# Patient Record
Sex: FEMALE | Race: WHITE | Employment: OTHER | URBAN - NONMETROPOLITAN AREA
[De-identification: names, ages, dates, MRNs, and addresses within clinical notes are randomized per-mention and may not be internally consistent; named-entity substitution may affect disease eponyms.]

---

## 2020-02-02 ENCOUNTER — APPOINTMENT (OUTPATIENT)
Dept: CT IMAGING | Age: 85
End: 2020-02-02
Payer: MEDICARE

## 2020-02-02 ENCOUNTER — HOSPITAL ENCOUNTER (EMERGENCY)
Age: 85
Discharge: HOME | End: 2020-02-02
Attending: EMERGENCY MEDICINE
Payer: MEDICARE

## 2020-02-02 ENCOUNTER — APPOINTMENT (OUTPATIENT)
Dept: GENERAL RADIOLOGY | Age: 85
End: 2020-02-02
Payer: MEDICARE

## 2020-02-02 VITALS
WEIGHT: 122 LBS | RESPIRATION RATE: 13 BRPM | HEIGHT: 59 IN | HEART RATE: 89 BPM | TEMPERATURE: 98 F | OXYGEN SATURATION: 95 % | BODY MASS INDEX: 24.6 KG/M2 | SYSTOLIC BLOOD PRESSURE: 140 MMHG | DIASTOLIC BLOOD PRESSURE: 69 MMHG

## 2020-02-02 LAB
-: ABNORMAL
ABSOLUTE EOS #: 0.23 K/UL (ref 0–0.44)
ABSOLUTE IMMATURE GRANULOCYTE: 0.26 K/UL (ref 0–0.3)
ABSOLUTE LYMPH #: 1.14 K/UL (ref 1.1–3.7)
ABSOLUTE MONO #: 1.83 K/UL (ref 0.1–1.2)
ALBUMIN SERPL-MCNC: 2.5 G/DL (ref 3.5–5.2)
ALBUMIN/GLOBULIN RATIO: 0.6 (ref 1–2.5)
ALP BLD-CCNC: 89 U/L (ref 35–104)
ALT SERPL-CCNC: 18 U/L (ref 5–33)
AMORPHOUS: ABNORMAL
ANION GAP SERPL CALCULATED.3IONS-SCNC: 14 MMOL/L (ref 9–17)
AST SERPL-CCNC: 23 U/L
BACTERIA: ABNORMAL
BASOPHILS # BLD: 0 % (ref 0–2)
BASOPHILS ABSOLUTE: 0.04 K/UL (ref 0–0.2)
BILIRUB SERPL-MCNC: 0.49 MG/DL (ref 0.3–1.2)
BILIRUBIN DIRECT: <0.08 MG/DL
BILIRUBIN URINE: NEGATIVE
BILIRUBIN, INDIRECT: ABNORMAL MG/DL (ref 0–1)
BUN BLDV-MCNC: 19 MG/DL (ref 8–23)
BUN/CREAT BLD: 28 (ref 9–20)
C-REACTIVE PROTEIN: 249.9 MG/L (ref 0–5)
CALCIUM SERPL-MCNC: 9.4 MG/DL (ref 8.6–10.4)
CASTS UA: ABNORMAL /LPF
CHLORIDE BLD-SCNC: 91 MMOL/L (ref 98–107)
CO2: 30 MMOL/L (ref 20–31)
COLOR: YELLOW
COMMENT UA: ABNORMAL
CREAT SERPL-MCNC: 0.69 MG/DL (ref 0.5–0.9)
CRYSTALS, UA: ABNORMAL /HPF
DIFFERENTIAL TYPE: ABNORMAL
EKG ATRIAL RATE: 84 BPM
EKG P AXIS: 39 DEGREES
EKG P-R INTERVAL: 148 MS
EKG Q-T INTERVAL: 362 MS
EKG QRS DURATION: 90 MS
EKG QTC CALCULATION (BAZETT): 427 MS
EKG R AXIS: -22 DEGREES
EKG T AXIS: 24 DEGREES
EKG VENTRICULAR RATE: 84 BPM
EOSINOPHILS RELATIVE PERCENT: 1 % (ref 1–4)
EPITHELIAL CELLS UA: ABNORMAL /HPF (ref 0–25)
GFR AFRICAN AMERICAN: >60 ML/MIN
GFR NON-AFRICAN AMERICAN: >60 ML/MIN
GFR SERPL CREATININE-BSD FRML MDRD: ABNORMAL ML/MIN/{1.73_M2}
GFR SERPL CREATININE-BSD FRML MDRD: ABNORMAL ML/MIN/{1.73_M2}
GLOBULIN: ABNORMAL G/DL (ref 1.5–3.8)
GLUCOSE BLD-MCNC: 107 MG/DL (ref 70–99)
GLUCOSE URINE: NEGATIVE
HCT VFR BLD CALC: 32.3 % (ref 36.3–47.1)
HEMOGLOBIN: 10.3 G/DL (ref 11.9–15.1)
IMMATURE GRANULOCYTES: 1 %
KETONES, URINE: NEGATIVE
LACTIC ACID, SEPSIS WHOLE BLOOD: NORMAL MMOL/L (ref 0.5–1.9)
LACTIC ACID, SEPSIS: 1.2 MMOL/L (ref 0.5–1.9)
LEUKOCYTE ESTERASE, URINE: ABNORMAL
LIPASE: 23 U/L (ref 13–60)
LYMPHOCYTES # BLD: 5 % (ref 24–43)
MAGNESIUM: 1.8 MG/DL (ref 1.6–2.6)
MCH RBC QN AUTO: 27.3 PG (ref 25.2–33.5)
MCHC RBC AUTO-ENTMCNC: 31.9 G/DL (ref 28.4–34.8)
MCV RBC AUTO: 85.7 FL (ref 82.6–102.9)
MONOCYTES # BLD: 9 % (ref 3–12)
MUCUS: ABNORMAL
MYOGLOBIN: 59 NG/ML (ref 25–58)
NITRITE, URINE: NEGATIVE
NRBC AUTOMATED: 0 PER 100 WBC
OTHER OBSERVATIONS UA: ABNORMAL
PDW BLD-RTO: 13.2 % (ref 11.8–14.4)
PH UA: 6 (ref 5–9)
PLATELET # BLD: 437 K/UL (ref 138–453)
PLATELET ESTIMATE: ABNORMAL
PMV BLD AUTO: 11.3 FL (ref 8.1–13.5)
POTASSIUM SERPL-SCNC: 3.3 MMOL/L (ref 3.7–5.3)
PROTEIN UA: NEGATIVE
RBC # BLD: 3.77 M/UL (ref 3.95–5.11)
RBC # BLD: ABNORMAL 10*6/UL
RBC UA: ABNORMAL /HPF (ref 0–2)
RENAL EPITHELIAL, UA: ABNORMAL /HPF
SEDIMENTATION RATE, ERYTHROCYTE: 119 MM (ref 0–20)
SEG NEUTROPHILS: 84 % (ref 36–65)
SEGMENTED NEUTROPHILS ABSOLUTE COUNT: 17.94 K/UL (ref 1.5–8.1)
SODIUM BLD-SCNC: 135 MMOL/L (ref 135–144)
SPECIFIC GRAVITY UA: 1.02 (ref 1.01–1.02)
TOTAL PROTEIN: 6.9 G/DL (ref 6.4–8.3)
TRICHOMONAS: ABNORMAL
TROPONIN INTERP: NORMAL
TROPONIN T: <0.03 NG/ML
TROPONIN, HIGH SENSITIVITY: NORMAL NG/L (ref 0–14)
TSH SERPL DL<=0.05 MIU/L-ACNC: 3.1 MIU/L (ref 0.3–5)
TURBIDITY: CLEAR
URINE HGB: ABNORMAL
UROBILINOGEN, URINE: NORMAL
WBC # BLD: 21.4 K/UL (ref 3.5–11.3)
WBC # BLD: ABNORMAL 10*3/UL
WBC UA: ABNORMAL /HPF (ref 0–5)
YEAST: ABNORMAL

## 2020-02-02 PROCEDURE — 83605 ASSAY OF LACTIC ACID: CPT

## 2020-02-02 PROCEDURE — 86140 C-REACTIVE PROTEIN: CPT

## 2020-02-02 PROCEDURE — 6360000002 HC RX W HCPCS: Performed by: EMERGENCY MEDICINE

## 2020-02-02 PROCEDURE — 99284 EMERGENCY DEPT VISIT MOD MDM: CPT

## 2020-02-02 PROCEDURE — 70450 CT HEAD/BRAIN W/O DYE: CPT

## 2020-02-02 PROCEDURE — 87040 BLOOD CULTURE FOR BACTERIA: CPT

## 2020-02-02 PROCEDURE — 6360000004 HC RX CONTRAST MEDICATION: Performed by: EMERGENCY MEDICINE

## 2020-02-02 PROCEDURE — 85025 COMPLETE CBC W/AUTO DIFF WBC: CPT

## 2020-02-02 PROCEDURE — 93010 ELECTROCARDIOGRAM REPORT: CPT | Performed by: INTERNAL MEDICINE

## 2020-02-02 PROCEDURE — 80076 HEPATIC FUNCTION PANEL: CPT

## 2020-02-02 PROCEDURE — 83735 ASSAY OF MAGNESIUM: CPT

## 2020-02-02 PROCEDURE — 83874 ASSAY OF MYOGLOBIN: CPT

## 2020-02-02 PROCEDURE — 81001 URINALYSIS AUTO W/SCOPE: CPT

## 2020-02-02 PROCEDURE — 84443 ASSAY THYROID STIM HORMONE: CPT

## 2020-02-02 PROCEDURE — 87086 URINE CULTURE/COLONY COUNT: CPT

## 2020-02-02 PROCEDURE — 93005 ELECTROCARDIOGRAM TRACING: CPT | Performed by: EMERGENCY MEDICINE

## 2020-02-02 PROCEDURE — 80048 BASIC METABOLIC PNL TOTAL CA: CPT

## 2020-02-02 PROCEDURE — 74177 CT ABD & PELVIS W/CONTRAST: CPT

## 2020-02-02 PROCEDURE — 96374 THER/PROPH/DIAG INJ IV PUSH: CPT

## 2020-02-02 PROCEDURE — 36415 COLL VENOUS BLD VENIPUNCTURE: CPT

## 2020-02-02 PROCEDURE — 84484 ASSAY OF TROPONIN QUANT: CPT

## 2020-02-02 PROCEDURE — 71046 X-RAY EXAM CHEST 2 VIEWS: CPT

## 2020-02-02 PROCEDURE — 85651 RBC SED RATE NONAUTOMATED: CPT

## 2020-02-02 PROCEDURE — 83690 ASSAY OF LIPASE: CPT

## 2020-02-02 RX ORDER — TRIAMTERENE AND HYDROCHLOROTHIAZIDE 37.5; 25 MG/1; MG/1
1 CAPSULE ORAL
COMMUNITY
Start: 2015-08-04

## 2020-02-02 RX ORDER — PREDNISONE 20 MG/1
TABLET ORAL
Qty: 65 TABLET | Refills: 0 | Status: SHIPPED | OUTPATIENT
Start: 2020-02-02

## 2020-02-02 RX ORDER — POTASSIUM CHLORIDE 750 MG/1
10 TABLET, FILM COATED, EXTENDED RELEASE ORAL
COMMUNITY
Start: 2019-08-20

## 2020-02-02 RX ORDER — DEXAMETHASONE SODIUM PHOSPHATE 10 MG/ML
6 INJECTION INTRAMUSCULAR; INTRAVENOUS ONCE
Status: COMPLETED | OUTPATIENT
Start: 2020-02-02 | End: 2020-02-02

## 2020-02-02 RX ADMIN — IOPAMIDOL 75 ML: 755 INJECTION, SOLUTION INTRAVENOUS at 11:17

## 2020-02-02 RX ADMIN — DEXAMETHASONE SODIUM PHOSPHATE 6 MG: 10 INJECTION INTRAMUSCULAR; INTRAVENOUS at 13:03

## 2020-02-02 ASSESSMENT — ENCOUNTER SYMPTOMS: EYE REDNESS: 0

## 2020-02-02 ASSESSMENT — PAIN SCALES - GENERAL: PAINLEVEL_OUTOF10: 3

## 2020-02-02 ASSESSMENT — PAIN DESCRIPTION - PAIN TYPE: TYPE: ACUTE PAIN

## 2020-02-02 ASSESSMENT — PAIN DESCRIPTION - LOCATION: LOCATION: HEAD

## 2020-02-02 NOTE — ED NOTES
Emerita Lopez from lab at bedside attempting cultures and lactic      Juliana Johnson RN  02/02/20 1010

## 2020-02-02 NOTE — ED NOTES
Blood collected, IV unsuccessful.  Walter RN going to attempt Marli Bonds 15, RN  02/02/20 476 [facility]  02/02/20 5919

## 2020-02-02 NOTE — ED NOTES
Dr Ryder Marks at bedside discussing results with patient and family     Bri Hutson RN  02/02/20 [phone]

## 2020-02-02 NOTE — ED PROVIDER NOTES
Forced sexual activity: Not on file   Other Topics Concern    Not on file   Social History Narrative    Not on file       SCREENINGS             PHYSICAL EXAM    (up to 7 for level 4, 8 or more for level 5)     ED Triage Vitals [02/02/20 0836]   BP Temp Temp Source Pulse Resp SpO2 Height Weight   (!) 142/73 98 °F (36.7 °C) Oral 85 23 97 % 4' 11\" (1.499 m) 122 lb (55.3 kg)       Physical Exam  Vitals signs reviewed. Constitutional:       Appearance: Normal appearance. She is not ill-appearing. HENT:      Head: Normocephalic. Right Ear: External ear normal.      Left Ear: External ear normal.      Nose: Nose normal.      Mouth/Throat:      Mouth: Mucous membranes are moist.   Eyes:      Extraocular Movements: Extraocular movements intact. Pupils: Pupils are equal, round, and reactive to light. Neck:      Musculoskeletal: Neck supple. Cardiovascular:      Rate and Rhythm: Normal rate and regular rhythm. Heart sounds: Normal heart sounds. Pulmonary:      Breath sounds: Normal breath sounds. No rhonchi or rales. Abdominal:      Palpations: Abdomen is soft. Tenderness: There is no abdominal tenderness. Musculoskeletal: Normal range of motion. General: No swelling or tenderness. Skin:     General: Skin is warm and dry. Coloration: Skin is not pale. Findings: No rash. Neurological:      General: No focal deficit present. Mental Status: She is alert and oriented to person, place, and time. Mental status is at baseline. DIAGNOSTIC RESULTS     EKG: All EKG's are interpreted by the Emergency Department Physician who either signs orCo-signs this chart in the absence of a cardiologist.    Sinus rhythm with a rate of 84 beats minute. Leftward axis. Left atrial enlargement. Old septal infarct.     RADIOLOGY:   Non-plain film images such as CT, Ultrasound and MRI are read by the radiologist. Plain radiographic images are visualized and preliminarily interpreted by the emergency physician with the below findings:    Interpretation per the Radiologist below, ifavailable at the time of this note:    CT ABDOMEN PELVIS W IV CONTRAST Additional Contrast? None   Final Result   * No acute intra-abdominal process. *Trace bilateral pleural effusions. *Small amount of pericardial fluid. Developing pericardial effusion cannot   be excluded. *3.5 cm indeterminate nodule left adrenal gland. Probable adenoma. Consider   adrenal CT for further evaluation. XR CHEST STANDARD (2 VW)   Final Result   No acute process. CT Head WO Contrast   Final Result   *No acute intracranial abnormality. *Cortical atrophy with moderate chronic microvascular ischemic changes. *Irregular calcifications seen involving the medial aspect of the right   cerebellar hemisphere. Finding is nonspecific and may be sequela of prior   trauma, infection, hemorrhage. Underlying lesion is felt less likely given   the distribution of calcifications. If further evaluation is needed, MRI   with IV contrast is recommended.                ED BEDSIDE ULTRASOUND:   Performed by ED Physician - none    LABS:  Labs Reviewed   BASIC METABOLIC PANEL - Abnormal; Notable for the following components:       Result Value    Glucose 107 (*)     Bun/Cre Ratio 28 (*)     Potassium 3.3 (*)     Chloride 91 (*)     All other components within normal limits   CBC WITH AUTO DIFFERENTIAL - Abnormal; Notable for the following components:    WBC 21.4 (*)     RBC 3.77 (*)     Hemoglobin 10.3 (*)     Hematocrit 32.3 (*)     Seg Neutrophils 84 (*)     Lymphocytes 5 (*)     Immature Granulocytes 1 (*)     Segs Absolute 17.94 (*)     Absolute Mono # 1.83 (*)     All other components within normal limits   SEDIMENTATION RATE - Abnormal; Notable for the following components:    Sed Rate 119 (*)     All other components within normal limits   C-REACTIVE PROTEIN - Abnormal; Notable for the following components: unspecified headache type    2. Elevated erythrocyte sedimentation rate    3. Jaw pain          DISPOSITION/PLAN   DISPOSITION Decision To Discharge 02/02/2020 12:51:52 PM      PATIENT REFERRED TO:  Rosalina Guzman MD  [address]  [address] [facility]      [facility] AS POSSIBLE    [place] [address]    In 1 week      HOSPITAL [facility] ED  [address] [address]  [phone]    If symptoms worsen      DISCHARGE MEDICATIONS:         Problem List:  There is no problem list on file for this patient. Summation      Patient Course: Discharged. ED Medicationsadministered this visit:    Medications   iopamidol (ISOVUE-370) 76 % injection 75 mL (75 mLs Intravenous Given 2/2/20 1117)   dexamethasone (DECADRON) injection 6 mg (6 mg Intravenous Given 2/2/20 1303)       New Prescriptions from this visit:    Discharge Medication List as of 2/2/2020  1:00 PM      START taking these medications    Details   predniSONE (DELTASONE) 20 MG tablet Take 3 tablets every morning for 5 days, then take 2 tablets every morning., Disp-65 tablet, R-0Normal             Follow-up:  Rosalina Guzman MD  [address]  [address] [place] [address]  [phone]      [facility] [address] [place] [address]    In 1 week      [facility]  [address]  [address] [facility]  [phone]    If symptoms worsen        Final Impression:   1. Nonintractable headache, unspecified chronicity pattern, unspecified headache type    2. Elevated erythrocyte sedimentation rate    3.  Jaw pain               (Please note that portions of this note were completed with a voice recognitionprogram.  Efforts were made to edit the dictations but occasionally words are mis-transcribed.)    Meggan Luther MD (electronically signed)  Attending Emergency

## 2020-02-03 LAB
CULTURE: NORMAL
Lab: NORMAL
SPECIMEN DESCRIPTION: NORMAL

## 2020-02-07 LAB
CULTURE: NORMAL
CULTURE: NORMAL
Lab: NORMAL
Lab: NORMAL
SPECIMEN DESCRIPTION: NORMAL
SPECIMEN DESCRIPTION: NORMAL

## 2020-02-12 ENCOUNTER — ANESTHESIA EVENT (OUTPATIENT)
Dept: OPERATING ROOM | Age: 85
End: 2020-02-12
Payer: MEDICARE

## 2020-02-13 ENCOUNTER — ANESTHESIA (OUTPATIENT)
Dept: OPERATING ROOM | Age: 85
End: 2020-02-13
Payer: MEDICARE

## 2020-02-13 ENCOUNTER — HOSPITAL ENCOUNTER (OUTPATIENT)
Age: 85
Setting detail: OUTPATIENT SURGERY
Discharge: HOME | End: 2020-02-13
Attending: SURGERY | Admitting: SURGERY
Payer: MEDICARE

## 2020-02-13 VITALS
SYSTOLIC BLOOD PRESSURE: 147 MMHG | DIASTOLIC BLOOD PRESSURE: 58 MMHG | WEIGHT: 125 LBS | HEART RATE: 72 BPM | HEIGHT: 60 IN | RESPIRATION RATE: 22 BRPM | OXYGEN SATURATION: 97 % | TEMPERATURE: 97.7 F | BODY MASS INDEX: 24.54 KG/M2

## 2020-02-13 VITALS — OXYGEN SATURATION: 100 % | SYSTOLIC BLOOD PRESSURE: 125 MMHG | DIASTOLIC BLOOD PRESSURE: 58 MMHG

## 2020-02-13 PROCEDURE — 2709999900 HC NON-CHARGEABLE SUPPLY: Performed by: SURGERY

## 2020-02-13 PROCEDURE — 6360000002 HC RX W HCPCS

## 2020-02-13 PROCEDURE — 7100000010 HC PHASE II RECOVERY - FIRST 15 MIN: Performed by: SURGERY

## 2020-02-13 PROCEDURE — 3700000000 HC ANESTHESIA ATTENDED CARE: Performed by: SURGERY

## 2020-02-13 PROCEDURE — 2500000003 HC RX 250 WO HCPCS

## 2020-02-13 PROCEDURE — 3600000012 HC SURGERY LEVEL 2 ADDTL 15MIN: Performed by: SURGERY

## 2020-02-13 PROCEDURE — 2580000003 HC RX 258: Performed by: ANESTHESIOLOGY

## 2020-02-13 PROCEDURE — 2580000003 HC RX 258

## 2020-02-13 PROCEDURE — 3700000001 HC ADD 15 MINUTES (ANESTHESIA): Performed by: SURGERY

## 2020-02-13 PROCEDURE — 2500000003 HC RX 250 WO HCPCS: Performed by: SURGERY

## 2020-02-13 PROCEDURE — 88305 TISSUE EXAM BY PATHOLOGIST: CPT

## 2020-02-13 PROCEDURE — 3600000002 HC SURGERY LEVEL 2 BASE: Performed by: SURGERY

## 2020-02-13 PROCEDURE — 7100000011 HC PHASE II RECOVERY - ADDTL 15 MIN: Performed by: SURGERY

## 2020-02-13 RX ORDER — SODIUM CHLORIDE 0.9 % (FLUSH) 0.9 %
10 SYRINGE (ML) INJECTION EVERY 12 HOURS SCHEDULED
Status: DISCONTINUED | OUTPATIENT
Start: 2020-02-13 | End: 2020-02-13 | Stop reason: HOSPADM

## 2020-02-13 RX ORDER — FENTANYL CITRATE 50 UG/ML
INJECTION, SOLUTION INTRAMUSCULAR; INTRAVENOUS PRN
Status: DISCONTINUED | OUTPATIENT
Start: 2020-02-13 | End: 2020-02-13 | Stop reason: SDUPTHER

## 2020-02-13 RX ORDER — LIDOCAINE HYDROCHLORIDE 20 MG/ML
INJECTION, SOLUTION EPIDURAL; INFILTRATION; INTRACAUDAL; PERINEURAL PRN
Status: DISCONTINUED | OUTPATIENT
Start: 2020-02-13 | End: 2020-02-13 | Stop reason: SDUPTHER

## 2020-02-13 RX ORDER — SODIUM CHLORIDE 9 MG/ML
INJECTION, SOLUTION INTRAVENOUS CONTINUOUS
Status: DISCONTINUED | OUTPATIENT
Start: 2020-02-14 | End: 2020-02-13

## 2020-02-13 RX ORDER — ONDANSETRON 2 MG/ML
4 INJECTION INTRAMUSCULAR; INTRAVENOUS
Status: DISCONTINUED | OUTPATIENT
Start: 2020-02-13 | End: 2020-02-13 | Stop reason: HOSPADM

## 2020-02-13 RX ORDER — LIDOCAINE HYDROCHLORIDE 10 MG/ML
INJECTION, SOLUTION EPIDURAL; INFILTRATION; INTRACAUDAL; PERINEURAL PRN
Status: DISCONTINUED | OUTPATIENT
Start: 2020-02-13 | End: 2020-02-13 | Stop reason: ALTCHOICE

## 2020-02-13 RX ORDER — SODIUM CHLORIDE 0.9 % (FLUSH) 0.9 %
10 SYRINGE (ML) INJECTION PRN
Status: DISCONTINUED | OUTPATIENT
Start: 2020-02-13 | End: 2020-02-13 | Stop reason: HOSPADM

## 2020-02-13 RX ORDER — PROPOFOL 10 MG/ML
INJECTION, EMULSION INTRAVENOUS PRN
Status: DISCONTINUED | OUTPATIENT
Start: 2020-02-13 | End: 2020-02-13 | Stop reason: SDUPTHER

## 2020-02-13 RX ORDER — FENTANYL CITRATE 50 UG/ML
25 INJECTION, SOLUTION INTRAMUSCULAR; INTRAVENOUS EVERY 5 MIN PRN
Status: DISCONTINUED | OUTPATIENT
Start: 2020-02-13 | End: 2020-02-13 | Stop reason: HOSPADM

## 2020-02-13 RX ORDER — PROPOFOL 10 MG/ML
INJECTION, EMULSION INTRAVENOUS CONTINUOUS PRN
Status: DISCONTINUED | OUTPATIENT
Start: 2020-02-13 | End: 2020-02-13 | Stop reason: SDUPTHER

## 2020-02-13 RX ORDER — HYDROMORPHONE HCL 110MG/55ML
0.25 PATIENT CONTROLLED ANALGESIA SYRINGE INTRAVENOUS EVERY 5 MIN PRN
Status: DISCONTINUED | OUTPATIENT
Start: 2020-02-13 | End: 2020-02-13 | Stop reason: HOSPADM

## 2020-02-13 RX ORDER — SODIUM CHLORIDE, SODIUM LACTATE, POTASSIUM CHLORIDE, CALCIUM CHLORIDE 600; 310; 30; 20 MG/100ML; MG/100ML; MG/100ML; MG/100ML
INJECTION, SOLUTION INTRAVENOUS CONTINUOUS
Status: DISCONTINUED | OUTPATIENT
Start: 2020-02-14 | End: 2020-02-13 | Stop reason: HOSPADM

## 2020-02-13 RX ORDER — SODIUM CHLORIDE, SODIUM LACTATE, POTASSIUM CHLORIDE, CALCIUM CHLORIDE 600; 310; 30; 20 MG/100ML; MG/100ML; MG/100ML; MG/100ML
INJECTION, SOLUTION INTRAVENOUS CONTINUOUS PRN
Status: DISCONTINUED | OUTPATIENT
Start: 2020-02-13 | End: 2020-02-13 | Stop reason: SDUPTHER

## 2020-02-13 RX ORDER — IBUPROFEN 200 MG
200 TABLET ORAL EVERY 6 HOURS PRN
COMMUNITY

## 2020-02-13 RX ORDER — LIDOCAINE HYDROCHLORIDE 10 MG/ML
1 INJECTION, SOLUTION EPIDURAL; INFILTRATION; INTRACAUDAL; PERINEURAL
Status: DISCONTINUED | OUTPATIENT
Start: 2020-02-13 | End: 2020-02-13 | Stop reason: HOSPADM

## 2020-02-13 RX ADMIN — Medication 25 MCG: at 12:41

## 2020-02-13 RX ADMIN — Medication 25 MCG: at 12:52

## 2020-02-13 RX ADMIN — PROPOFOL 20 MG: 10 INJECTION, EMULSION INTRAVENOUS at 12:40

## 2020-02-13 RX ADMIN — SODIUM CHLORIDE, POTASSIUM CHLORIDE, SODIUM LACTATE AND CALCIUM CHLORIDE: 600; 310; 30; 20 INJECTION, SOLUTION INTRAVENOUS at 12:35

## 2020-02-13 RX ADMIN — LIDOCAINE HYDROCHLORIDE 20 MG: 20 INJECTION, SOLUTION EPIDURAL; INFILTRATION; INTRACAUDAL; PERINEURAL at 12:41

## 2020-02-13 RX ADMIN — SODIUM CHLORIDE, POTASSIUM CHLORIDE, SODIUM LACTATE AND CALCIUM CHLORIDE: 600; 310; 30; 20 INJECTION, SOLUTION INTRAVENOUS at 11:38

## 2020-02-13 RX ADMIN — PROPOFOL 50 MCG/KG/MIN: 10 INJECTION, EMULSION INTRAVENOUS at 12:40

## 2020-02-13 SDOH — HEALTH STABILITY: MENTAL HEALTH: HOW OFTEN DO YOU HAVE A DRINK CONTAINING ALCOHOL?: NEVER

## 2020-02-13 ASSESSMENT — PULMONARY FUNCTION TESTS
PIF_VALUE: 1
PIF_VALUE: 0
PIF_VALUE: 1
PIF_VALUE: 0
PIF_VALUE: 1
PIF_VALUE: 0
PIF_VALUE: 1
PIF_VALUE: 0
PIF_VALUE: 1
PIF_VALUE: 1

## 2020-02-13 ASSESSMENT — PAIN SCALES - GENERAL
PAINLEVEL_OUTOF10: 0

## 2020-02-13 ASSESSMENT — PAIN - FUNCTIONAL ASSESSMENT: PAIN_FUNCTIONAL_ASSESSMENT: 0-10

## 2020-02-13 NOTE — OP NOTE
skin.  The patient was then repositioned overlooking her left shoulder exposing the right temple and in the usual fashion the area was cleaned and prepped in sterile drapes applied. Once again lidocaine was used subcutaneously along the previously marked course of the temporal artery pulse. Skin was incised and cautery was used to dissect down through the subcutaneous tissues and fascia. Again a 2 to 3 cm segment was mobilized and clamps were placed for proximal and distal control as well as on the side branch. The specimen was removed and 4-0 Monocryl ties were used to ligate all the free ends of the artery. 4-0 Vicryl was used in running subcuticular fashion to close the skin. Dermabond was applied as a sterile dressing bilaterally. The procedure was tolerated well without complication.

## 2020-02-13 NOTE — H&P
History and Physical Service   [facility]    HISTORY AND PHYSICAL EXAMINATION            Date of Evaluation: 2/13/2020  Patient name:  Tammie Riggs  MRN:   [id number]  YOB: 1935  PCP:    Mark Bansal    History Obtained From:     Patient, medical records    History of Present Illness: This is Tammie Riggs a 80 y.o. female who presents today for a bilateral temporal artery biopsy by Dr. Gabriel Kang. The patient has had complaints of left sided jaw pain ongoing for 6 weeks that occurs with chewing. She also has had mild intermittent bitemporal headaches, double vision and fatigue. She developed a brief episode of with aphasia, slurred speech and right mouth droop on 1/28/20 that was first noticed by her daughter in law and quickly resolved. She reports having a weight loss of 15 pounds over the past 2 months due to the pain with chewing so she doesn't want to eat. A CT of the head without contrast at [place] ED on 2/2/20 showed no acute findings. She was seen at [facility] ED on 2/2/20 and was started on Prednisone for probable temporal arteritis. 2/2/20 labwork showed: WBC 21.4  With blood cultures showing no growth (with no infectious focus found with ED work up) K+was 3.3, Hgb 10.3, .9; Urine trace LE and Hgb,       Past Medical History:     Past Medical History:   Diagnosis Date    Arthritis     Prolonged emergence from general anesthesia         Past Surgical History:     Past Surgical History:   Procedure Laterality Date    BREAST BIOPSY Left 2000    CATARACT REMOVAL WITH IMPLANT  2007    COLONOSCOPY  x 2    EYE SURGERY Bilateral     macular surgery    PARTIAL HYSTERECTOMY      TOTAL KNEE ARTHROPLASTY Right 10/02/2018        Medications Prior to Admission:     Prior to Admission medications    Medication Sig Start Date End Date Taking?  Authorizing Provider   aspirin 81 MG tablet Take 81 mg by mouth daily   Yes Historical Provider,

## 2020-02-13 NOTE — BRIEF OP NOTE
Brief Postoperative Note  ______________________________________________________________    Patient: Yair Torres  YOB: 1935  MRN: [id number]  Date of Procedure: 2/13/2020    Pre-Op Diagnosis: GIANT CELL ARTERITIS    Post-Op Diagnosis: Same       Procedure(s):  BILATERAL TEMPORAL ARTERY BIOPSY LIGATION    Anesthesia: TIVA    Surgeon(s):  Wenceslao Tilley MD    Assistant: none    Estimated Blood Loss (mL): less than 50     Complications: None    Specimens:   ID Type Source Tests Collected by Time Destination   A : LEFT TEMPORAL ARTERY Tissue Head SURGICAL PATHOLOGY Wenceslao Tilley MD 2/13/2020 1250    B : RIGHT TEMPORAL ARTERY Tissue Face SURGICAL PATHOLOGY Wenceslao Tilley MD 2/13/2020 1255        Implants:  * No implants in log *      Drains: * No LDAs found *    Findings:     Wenceslao Tilley MD  Date: 2/13/2020  Time: 1:34 PM

## 2020-02-13 NOTE — ANESTHESIA PRE PROCEDURE
Problem List:  There is no problem list on file for this patient. Past Medical History:        Diagnosis Date    Arthritis     Prolonged emergence from general anesthesia        Past Surgical History:        Procedure Laterality Date    BREAST BIOPSY Left     CATARACT REMOVAL WITH IMPLANT      COLONOSCOPY  x 2    EYE SURGERY Bilateral     macular surgery    PARTIAL HYSTERECTOMY      TOTAL KNEE ARTHROPLASTY Right 10/02/2018       Social History:    Social History     Tobacco Use    Smoking status: Former Smoker     Last attempt to quit:      Years since quittin.1    Smokeless tobacco: Never Used   Substance Use Topics    Alcohol use: Never     Frequency: Never                                Counseling given: Not Answered      Vital Signs (Current):   Vitals:    20 1107 20 1109 20 1129   BP:  (!) 157/58    Pulse:  75    Resp:  16    Temp:   98.1 °F (36.7 °C)   TempSrc:  Oral Tympanic   SpO2:  100%    Weight: 125 lb (56.7 kg)     Height: 5' (1.524 m)                                                BP Readings from Last 3 Encounters:   20 (!) 157/58   20 (!) 140/69       NPO Status: Time of last liquid consumption:                         Time of last solid consumption:                         Date of last liquid consumption: 20                        Date of last solid food consumption: 20    BMI:   Wt Readings from Last 3 Encounters:   20 125 lb (56.7 kg)   20 122 lb (55.3 kg)     Body mass index is 24.41 kg/m².     CBC:   Lab Results   Component Value Date    WBC 21.4 2020    RBC 3.77 2020    HGB 10.3 2020    HCT 32.3 2020    MCV 85.7 2020    RDW 13.2 2020     2020       CMP:   Lab Results   Component Value Date     2020    K 3.3 2020    CL 91 2020    CO2 30 2020    BUN 19 2020    CREATININE 0.69 2020    GFRAA >60 2020

## 2020-02-17 LAB — SURGICAL PATHOLOGY REPORT: NORMAL

## (undated) DEVICE — #1020 STERI DRAPE 41MM X 41MM SMALL: Brand: STERI-DRAPE™

## (undated) DEVICE — INTENDED FOR TISSUE SEPARATION, AND OTHER PROCEDURES THAT REQUIRE A SHARP SURGICAL BLADE TO PUNCTURE OR CUT.: Brand: BARD-PARKER ® CARBON RIB-BACK BLADES

## (undated) DEVICE — GEL US 20GM NONIRRITATING OVERWRAPPED FILE PCH TRNSMIT

## (undated) DEVICE — SYRINGE IRRIG 60ML SFT PLIABLE BLB EZ TO GRP 1 HND USE W/

## (undated) DEVICE — TRAY PREP DRY W/ PREM GLV 2 APPL 6 SPNG 2 UNDPD 1 OVERWRAP

## (undated) DEVICE — DBD-PACK,EENT,SIRUS,PK II: Brand: MEDLINE

## (undated) DEVICE — CONMED ACCESSORY ELECTRODE, NEEDLE ELECTRODE: Brand: CONMED

## (undated) DEVICE — BLADE ES ELASTOMERIC COAT INSUL DURABLE BEND UPTO 90DEG

## (undated) DEVICE — MINOR BSIN PK

## (undated) DEVICE — SYRINGE MED 10ML TRNSLUC BRL PLUNG BLK MRK POLYPR CTRL

## (undated) DEVICE — GLOVE SURG SZ 75 CRM LTX FREE POLYISOPRENE POLYMER BEAD ANTI

## (undated) DEVICE — NEEDLE HYPO 25GA L1.5IN BLU POLYPR HUB S STL REG BVL STR

## (undated) DEVICE — ADHESIVE SKIN CLSR 0.7ML TOP DERMBND ADV